# Patient Record
Sex: MALE | Race: WHITE | Employment: STUDENT | URBAN - METROPOLITAN AREA
[De-identification: names, ages, dates, MRNs, and addresses within clinical notes are randomized per-mention and may not be internally consistent; named-entity substitution may affect disease eponyms.]

---
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
## 2024-01-31 ENCOUNTER — TELEPHONE (OUTPATIENT)
Dept: ENT CLINIC | Age: 19
End: 2024-01-31

## 2024-01-31 NOTE — TELEPHONE ENCOUNTER
LMOVM stating that received message and canceled appt.  Informed patient to call office to reschedule

## 2024-09-05 ENCOUNTER — OFFICE VISIT (OUTPATIENT)
Dept: PRIMARY CARE CLINIC | Age: 19
End: 2024-09-05

## 2024-09-05 VITALS
TEMPERATURE: 98 F | SYSTOLIC BLOOD PRESSURE: 138 MMHG | HEART RATE: 68 BPM | RESPIRATION RATE: 16 BRPM | HEIGHT: 70 IN | BODY MASS INDEX: 22.19 KG/M2 | OXYGEN SATURATION: 100 % | DIASTOLIC BLOOD PRESSURE: 89 MMHG | WEIGHT: 155 LBS

## 2024-09-05 DIAGNOSIS — S29.012A STRAIN OF MUSCLE AND TENDON OF BACK WALL OF THORAX, INITIAL ENCOUNTER: ICD-10-CM

## 2024-09-05 DIAGNOSIS — R35.0 URINARY FREQUENCY: Primary | ICD-10-CM

## 2024-09-05 LAB
BILIRUBIN, POC: NORMAL
BLOOD URINE, POC: NORMAL
CLARITY, POC: CLEAR
COLOR, POC: YELLOW
GLUCOSE URINE, POC: NORMAL MG/DL
HBA1C MFR BLD: 5 %
KETONES, POC: NORMAL MG/DL
LEUKOCYTE EST, POC: NORMAL
NITRITE, POC: NORMAL
PH, POC: 6
PROTEIN, POC: NORMAL MG/DL
SPECIFIC GRAVITY, POC: 1.01
UROBILINOGEN, POC: NORMAL MG/DL

## 2024-09-05 NOTE — PROGRESS NOTES
Chief Complaint:   Back Pain (Patient states last night had frequency urination last night and left lower back pain started 5 days ago- Denies any dysuria or hematuria. )    History of Present Illness   HPI:  Hi Da Silva is a 19 y.o. male who [facility]. Patient reports since last night he has been urinating more often. States he urinated 12 times today since he woke up. Reports he did urinate two times in the middle of the night. Reports the urine is more clear than normal as well. He does report some left sided flank pain as well, he states he was rockclimbing the day is unsure if it is related to that.. Denies any increase in thirst or hunger. Reports hx of diabetes with grandmother.  Patient denies any dysuria, hematuria, penile drainage or discharge.  He denies any concern for STDs at this time.  He denies any abdominal pain.    Prior to Visit Medications    Medication Sig Taking? Authorizing Provider   diphenhydrAMINE (BENADRYL ALLERGY) 25 MG tablet Take 1 tablet by mouth every 6 hours as needed for Itching  Patient not taking: Reported on 9/5/2024  Barber Anderson MD   montelukast (SINGULAIR) 10 MG tablet Take 1 tablet by mouth daily  Patient not taking: Reported on 9/5/2024  Alex Mancilla APRN - CNP   fluticasone (FLONASE) 50 MCG/ACT nasal spray 1 spray by Each Nostril route daily  Patient not taking: Reported on 9/5/2024  Barber Anderson MD       Review of Systems   Unless otherwise stated in this report or unable to obtain because of the patient's clinical or mental status as evidenced by the medical record, this patients's positive and negative responses for Review of Systems, constitutional, psych, eyes, ENT, cardiovascular, respiratory, gastrointestinal, neurological, genitourinary, musculoskeletal, integument systems and systems related to the presenting problem are either stated in the preceding or were not pertinent or were negative

## 2024-09-30 ENCOUNTER — OFFICE VISIT (OUTPATIENT)
Dept: PRIMARY CARE CLINIC | Age: 19
End: 2024-09-30

## 2024-09-30 VITALS
DIASTOLIC BLOOD PRESSURE: 68 MMHG | RESPIRATION RATE: 16 BRPM | HEIGHT: 70 IN | WEIGHT: 150 LBS | BODY MASS INDEX: 21.47 KG/M2 | OXYGEN SATURATION: 98 % | HEART RATE: 70 BPM | SYSTOLIC BLOOD PRESSURE: 121 MMHG | TEMPERATURE: 98.2 F

## 2024-09-30 DIAGNOSIS — R10.12 LEFT UPPER QUADRANT ABDOMINAL PAIN: Primary | ICD-10-CM

## 2024-09-30 DIAGNOSIS — R11.0 NAUSEA: ICD-10-CM

## 2024-09-30 PROCEDURE — 99213 OFFICE O/P EST LOW 20 MIN: CPT

## 2024-09-30 NOTE — PROGRESS NOTES
Chief Complaint       Abdominal Pain (Patient complains of left side abdominal pain right below the ribs)      History of Present Illness   Source of history provided by: patient.      Hi Da Silva is a 19 y.o. old male presenting to the walk in clinic for evaluation of abdominal pain which began a few weeks ago. Pain is located over the LUQ. States the pain does not radiate. Patient describes the pain as sharp/stabbing, intermittent, and a current 6/10. Reports peak pain as an 8-9/10. States pain has been progressing since onset. Reports associated nausea, but denies any vomiting or diarrhea. Has tried taking nothing OTC for symptomatic relief. States his team doctor has prescribed anti-inflammatories that have not worked.  Patient cannot elaborate further regarding specific type of anti-inflammatories. Denies any fever, chills, generalized body aches, loss of taste/smell, dysphagia, CP, SOB, dysuria, hematuria, change in stool color/consistency, melena, coffee-ground emesis, hematemesis, hematochezia, HA, sore throat, cough, rash, or lethargy. States abdominal pain is not associated with food.  No LMP for male patient.    ROS    Unless otherwise stated in this report or unable to obtain because of the patient's clinical or mental status as evidenced by the medical record, this patients's positive and negative responses for Review of Systems, constitutional, psych, eyes, ENT, cardiovascular, respiratory, gastrointestinal, neurological, genitourinary, musculoskeletal, integument systems and systems related to the presenting problem are either stated in the preceding or were not pertinent or were negative for the symptoms and/or complaints related to the medical problem.    Past Medical History:  has no past medical history on file.  Past Surgical History:  has no past surgical history on file.  Social History:  reports that he has never smoked. He has never been exposed to tobacco smoke. He has never used

## 2024-10-07 ENCOUNTER — APPOINTMENT (OUTPATIENT)
Dept: CT IMAGING | Age: 19
End: 2024-10-07
Payer: COMMERCIAL

## 2024-10-07 ENCOUNTER — HOSPITAL ENCOUNTER (EMERGENCY)
Age: 19
Discharge: HOME | End: 2024-10-07
Payer: COMMERCIAL

## 2024-10-07 VITALS
OXYGEN SATURATION: 99 % | DIASTOLIC BLOOD PRESSURE: 63 MMHG | SYSTOLIC BLOOD PRESSURE: 135 MMHG | HEART RATE: 50 BPM | RESPIRATION RATE: 16 BRPM | TEMPERATURE: 98.5 F

## 2024-10-07 DIAGNOSIS — N20.0 KIDNEY STONE: ICD-10-CM

## 2024-10-07 DIAGNOSIS — R10.30 LOWER ABDOMINAL PAIN: Primary | ICD-10-CM

## 2024-10-07 LAB
ALBUMIN SERPL-MCNC: 4.7 G/DL (ref 3.5–5.2)
ALP SERPL-CCNC: 95 U/L (ref 40–129)
ALT SERPL-CCNC: 15 U/L (ref 0–40)
ANION GAP SERPL CALCULATED.3IONS-SCNC: 10 MMOL/L (ref 7–16)
AST SERPL-CCNC: 14 U/L (ref 0–39)
BASOPHILS # BLD: 0.05 K/UL (ref 0–0.2)
BASOPHILS NFR BLD: 1 % (ref 0–2)
BILIRUB SERPL-MCNC: 0.3 MG/DL (ref 0–1.2)
BILIRUB UR QL STRIP: NEGATIVE
BUN SERPL-MCNC: 22 MG/DL (ref 6–20)
CALCIUM SERPL-MCNC: 9.6 MG/DL (ref 8.6–10.2)
CHLORIDE SERPL-SCNC: 102 MMOL/L (ref 98–107)
CLARITY UR: CLEAR
CO2 SERPL-SCNC: 27 MMOL/L (ref 22–29)
COLOR UR: YELLOW
COMMENT: ABNORMAL
CREAT SERPL-MCNC: 1.2 MG/DL (ref 0.7–1.2)
EOSINOPHIL # BLD: 0.12 K/UL (ref 0.05–0.5)
EOSINOPHILS RELATIVE PERCENT: 2 % (ref 0–6)
ERYTHROCYTE [DISTWIDTH] IN BLOOD BY AUTOMATED COUNT: 12.3 % (ref 11.5–15)
GFR, ESTIMATED: >90 ML/MIN/1.73M2
GLUCOSE SERPL-MCNC: 95 MG/DL (ref 74–99)
GLUCOSE UR STRIP-MCNC: NEGATIVE MG/DL
HCT VFR BLD AUTO: 48.2 % (ref 37–54)
HGB BLD-MCNC: 16.6 G/DL (ref 12.5–16.5)
HGB UR QL STRIP.AUTO: NEGATIVE
IMM GRANULOCYTES # BLD AUTO: <0.03 K/UL (ref 0–0.58)
IMM GRANULOCYTES NFR BLD: 0 % (ref 0–5)
KETONES UR STRIP-MCNC: NEGATIVE MG/DL
LEUKOCYTE ESTERASE UR QL STRIP: NEGATIVE
LIPASE SERPL-CCNC: 31 U/L (ref 13–60)
LYMPHOCYTES NFR BLD: 2.4 K/UL (ref 1.5–4)
LYMPHOCYTES RELATIVE PERCENT: 32 % (ref 20–42)
MAGNESIUM SERPL-MCNC: 2.2 MG/DL (ref 1.6–2.6)
MCH RBC QN AUTO: 31 PG (ref 26–35)
MCHC RBC AUTO-ENTMCNC: 34.4 G/DL (ref 32–34.5)
MCV RBC AUTO: 89.9 FL (ref 80–99.9)
MONOCYTES NFR BLD: 0.57 K/UL (ref 0.1–0.95)
MONOCYTES NFR BLD: 8 % (ref 2–12)
NEUTROPHILS NFR BLD: 58 % (ref 43–80)
NEUTS SEG NFR BLD: 4.38 K/UL (ref 1.8–7.3)
NITRITE UR QL STRIP: NEGATIVE
PH UR STRIP: 6 [PH] (ref 5–9)
PLATELET # BLD AUTO: 248 K/UL (ref 130–450)
PMV BLD AUTO: 9.7 FL (ref 7–12)
POTASSIUM SERPL-SCNC: 4.4 MMOL/L (ref 3.5–5)
PROT SERPL-MCNC: 7 G/DL (ref 6.4–8.3)
PROT UR STRIP-MCNC: NEGATIVE MG/DL
RBC # BLD AUTO: 5.36 M/UL (ref 3.8–5.8)
SODIUM SERPL-SCNC: 139 MMOL/L (ref 132–146)
SP GR UR STRIP: >1.03 (ref 1–1.03)
UROBILINOGEN UR STRIP-ACNC: 0.2 EU/DL (ref 0–1)
WBC OTHER # BLD: 7.5 K/UL (ref 4.5–11.5)

## 2024-10-07 PROCEDURE — 80053 COMPREHEN METABOLIC PANEL: CPT

## 2024-10-07 PROCEDURE — 99285 EMERGENCY DEPT VISIT HI MDM: CPT

## 2024-10-07 PROCEDURE — 83690 ASSAY OF LIPASE: CPT

## 2024-10-07 PROCEDURE — 6370000000 HC RX 637 (ALT 250 FOR IP): Performed by: NURSE PRACTITIONER

## 2024-10-07 PROCEDURE — 6360000004 HC RX CONTRAST MEDICATION: Performed by: RADIOLOGY

## 2024-10-07 PROCEDURE — 6360000002 HC RX W HCPCS: Performed by: NURSE PRACTITIONER

## 2024-10-07 PROCEDURE — 74177 CT ABD & PELVIS W/CONTRAST: CPT

## 2024-10-07 PROCEDURE — 81003 URINALYSIS AUTO W/O SCOPE: CPT

## 2024-10-07 PROCEDURE — 96374 THER/PROPH/DIAG INJ IV PUSH: CPT

## 2024-10-07 PROCEDURE — 83735 ASSAY OF MAGNESIUM: CPT

## 2024-10-07 PROCEDURE — 85025 COMPLETE CBC W/AUTO DIFF WBC: CPT

## 2024-10-07 RX ORDER — IOPAMIDOL 755 MG/ML
75 INJECTION, SOLUTION INTRAVASCULAR
Status: COMPLETED | OUTPATIENT
Start: 2024-10-07 | End: 2024-10-07

## 2024-10-07 RX ORDER — CYCLOBENZAPRINE HCL 10 MG
10 TABLET ORAL ONCE
Status: COMPLETED | OUTPATIENT
Start: 2024-10-07 | End: 2024-10-07

## 2024-10-07 RX ORDER — KETOROLAC TROMETHAMINE 30 MG/ML
30 INJECTION, SOLUTION INTRAMUSCULAR; INTRAVENOUS ONCE
Status: COMPLETED | OUTPATIENT
Start: 2024-10-07 | End: 2024-10-07

## 2024-10-07 RX ADMIN — IOPAMIDOL 75 ML: 755 INJECTION, SOLUTION INTRAVENOUS at 22:39

## 2024-10-07 RX ADMIN — KETOROLAC TROMETHAMINE 30 MG: 30 INJECTION, SOLUTION INTRAMUSCULAR at 21:27

## 2024-10-07 RX ADMIN — CYCLOBENZAPRINE 10 MG: 10 TABLET, FILM COATED ORAL at 21:29

## 2024-10-07 ASSESSMENT — LIFESTYLE VARIABLES
HOW OFTEN DO YOU HAVE A DRINK CONTAINING ALCOHOL: NEVER
HOW MANY STANDARD DRINKS CONTAINING ALCOHOL DO YOU HAVE ON A TYPICAL DAY: PATIENT DOES NOT DRINK

## 2024-10-07 ASSESSMENT — PAIN DESCRIPTION - LOCATION: LOCATION: ABDOMEN

## 2024-10-07 ASSESSMENT — PAIN SCALES - GENERAL: PAINLEVEL_OUTOF10: 4

## 2024-10-08 NOTE — DISCHARGE INSTRUCTIONS
EXAMINATION:  CT OF THE ABDOMEN AND PELVIS WITH CONTRAST 10/7/2024 10:31 pm     TECHNIQUE:  CT of the abdomen and pelvis was performed with the administration of  intravenous contrast. Multiplanar reformatted images are provided for review.  Automated exposure control, iterative reconstruction, and/or weight based  adjustment of the mA/kV was utilized to reduce the radiation dose to as low  as reasonably achievable.     COMPARISON:  None.     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     Organs: The liver, gallbladder, spleen, pancreas, adrenals are unremarkable.  There are punctate nonobstructing renal calculi versus early excretion of  contrast.  No hydronephrosis.     GI/Bowel:  There is no evidence of bowel obstruction.  No evidence of  abnormal bowel wall thickening or distension.  The appendix is normal.     Pelvis: The urinary bladder is decompressed.  The prostate is unremarkable.     Peritoneum/Retroperitoneum: No evidence of ascites or free air.  No evidence  of lymphadenopathy.  Aorta is normal in caliber.     Bones/Soft Tissues:  No acute abnormality of the visualized osseous  structures.  No focal soft tissue abnormality.     IMPRESSION:  1. No acute intra-abdominal or pelvic process.  2. Punctate nonobstructing renal calculi versus early excretion of contrast.

## 2024-10-08 NOTE — ED PROVIDER NOTES
Total Bilirubin 0.3 0.0 - 1.2 mg/dL    Alkaline Phosphatase 95 40 - 129 U/L    ALT 15 0 - 40 U/L    AST 14 0 - 39 U/L   Lipase   Result Value Ref Range    Lipase 31 13 - 60 U/L   Magnesium   Result Value Ref Range    Magnesium 2.2 1.6 - 2.6 mg/dL   Urinalysis   Result Value Ref Range    Color, UA Yellow Yellow    Turbidity UA Clear Clear    Glucose, Ur NEGATIVE NEGATIVE mg/dL    Bilirubin, Urine NEGATIVE NEGATIVE    Ketones, Urine NEGATIVE NEGATIVE mg/dL    Specific Gravity, UA >1.030 (H) 1.005 - 1.030    Urine Hgb NEGATIVE NEGATIVE    pH, Urine 6.0 5.0 - 9.0    Protein, UA NEGATIVE NEGATIVE mg/dL    Urobilinogen, Urine 0.2 0.0 - 1.0 EU/dL    Nitrite, Urine NEGATIVE NEGATIVE    Leukocyte Esterase, Urine NEGATIVE NEGATIVE    Comment       Microscopic exam not performed based on chemical results unless requested in original order.       RADIOLOGY:  Interpreted by Radiologist.  CT ABDOMEN PELVIS W IV CONTRAST Additional Contrast? None   Final Result   1. No acute intra-abdominal or pelvic process.   2. Punctate nonobstructing renal calculi versus early excretion of contrast.               ------------------------- NURSING NOTES AND VITALS REVIEWED ---------------------------   The nursing notes within the ED encounter and vital signs as below have been reviewed by myself.  /63   Pulse 50   Temp 98.5 °F (36.9 °C) (Oral)   Resp 16   SpO2 99%   Oxygen Saturation Interpretation: Normal    The patient’s available past medical records and past encounters were reviewed.        ------------------------------ ED COURSE/MEDICAL DECISION MAKING----------------------  Medications   ketorolac (TORADOL) injection 30 mg (30 mg IntraVENous Given 10/7/24 2127)   cyclobenzaprine (FLEXERIL) tablet 10 mg (10 mg Oral Given 10/7/24 2129)   iopamidol (ISOVUE-370) 76 % injection 75 mL (75 mLs IntraVENous Given 10/7/24 2239)             Medical Decision Making:        Hi Da Silva is a 19 y.o. male presenting to the ED for

## 2025-02-27 ENCOUNTER — OFFICE VISIT (OUTPATIENT)
Dept: PRIMARY CARE CLINIC | Age: 20
End: 2025-02-27

## 2025-02-27 VITALS
DIASTOLIC BLOOD PRESSURE: 85 MMHG | HEART RATE: 101 BPM | WEIGHT: 155 LBS | TEMPERATURE: 100.9 F | SYSTOLIC BLOOD PRESSURE: 134 MMHG | OXYGEN SATURATION: 98 % | BODY MASS INDEX: 22.19 KG/M2 | RESPIRATION RATE: 18 BRPM | HEIGHT: 70 IN

## 2025-02-27 DIAGNOSIS — J02.9 SORE THROAT: ICD-10-CM

## 2025-02-27 DIAGNOSIS — J10.1 INFLUENZA A: Primary | ICD-10-CM

## 2025-02-27 LAB
INFLUENZA A ANTIBODY: POSITIVE
INFLUENZA B ANTIBODY: NEGATIVE
Lab: NORMAL
PERFORMING INSTRUMENT: NORMAL
QC PASS/FAIL: NORMAL
S PYO AG THROAT QL: NORMAL
SARS-COV-2, POC: NORMAL

## 2025-02-27 RX ORDER — OSELTAMIVIR PHOSPHATE 75 MG/1
75 CAPSULE ORAL 2 TIMES DAILY
Qty: 10 CAPSULE | Refills: 0 | Status: SHIPPED | OUTPATIENT
Start: 2025-02-27 | End: 2025-03-04

## 2025-02-27 RX ORDER — METHYLPREDNISOLONE 4 MG/1
TABLET ORAL
Qty: 1 KIT | Refills: 0 | Status: SHIPPED | OUTPATIENT
Start: 2025-02-27

## 2025-02-27 NOTE — PROGRESS NOTES
Chief Complaint   Congestion (Started 02/25/2025 with post nasal drip, \"halucinations while sleeping\", sweats/chills, sore throat. Daquil, Nitequil help. )    History of Present Illness   Source of history provided by:  patient.     Hi Da Silva is a 19 y.o. old male who presents to the walk-in with complaints of a nonproductive cough, fever, body aches, chills, mild nausea, sore throat, and nasal congestion/drainage. States symptoms have been present x 2 days. States symptoms have progressed since onset. Denies any CP, SOB, abdominal pain, vomiting, diarrhea, rash, or lethargy. Has been taking Dayquil and Nyquil with some symptomatic relief. Denies any hx of asthma, COPD, or tobacco use. Denies any known sick contacts. Patient did not receive this years seasonal influenza vaccination.    Review of Systems   Unless otherwise stated in this report or unable to obtain because of the patient's clinical or mental status as evidenced by the medical record, this patients's positive and negative responses for Review of Systems, constitutional, psych, eyes, ENT, cardiovascular, respiratory, gastrointestinal, neurological, genitourinary, musculoskeletal, integument systems and systems related to the presenting problem are either stated in the preceding or were negative for the symptoms and/or complaints related to the medical problem.    Past Medical History:  has no past medical history on file.   Past Surgical History:  has no past surgical history on file.  Social History:  reports that he has never smoked. He has never been exposed to tobacco smoke. He has never used smokeless tobacco. He reports that he does not currently use alcohol. He reports that he does not use drugs.  Family History: family history is not on file.  Allergies: Seasonal    Physical Exam   Vital Signs:  /85 (Site: Right Upper Arm, Position: Sitting, Cuff Size: Medium Adult)   Pulse (!) 101   Temp (!) 100.9 °F (38.3 °C) (Oral)   Resp